# Patient Record
Sex: FEMALE | Race: WHITE | NOT HISPANIC OR LATINO | Employment: FULL TIME | URBAN - METROPOLITAN AREA
[De-identification: names, ages, dates, MRNs, and addresses within clinical notes are randomized per-mention and may not be internally consistent; named-entity substitution may affect disease eponyms.]

---

## 2023-11-06 ENCOUNTER — APPOINTMENT (OUTPATIENT)
Dept: RADIOLOGY | Facility: HOSPITAL | Age: 43
End: 2023-11-06
Payer: COMMERCIAL

## 2023-11-06 ENCOUNTER — HOSPITAL ENCOUNTER (EMERGENCY)
Facility: HOSPITAL | Age: 43
Discharge: HOME | End: 2023-11-06
Attending: EMERGENCY MEDICINE
Payer: COMMERCIAL

## 2023-11-06 VITALS
HEART RATE: 87 BPM | SYSTOLIC BLOOD PRESSURE: 155 MMHG | TEMPERATURE: 98.1 F | OXYGEN SATURATION: 96 % | RESPIRATION RATE: 22 BRPM | DIASTOLIC BLOOD PRESSURE: 102 MMHG | HEIGHT: 64 IN | WEIGHT: 261.02 LBS | BODY MASS INDEX: 44.56 KG/M2

## 2023-11-06 DIAGNOSIS — R00.0 TACHYCARDIA: Primary | ICD-10-CM

## 2023-11-06 DIAGNOSIS — R73.9 HYPERGLYCEMIA: ICD-10-CM

## 2023-11-06 LAB
ALBUMIN SERPL-MCNC: 3.8 G/DL (ref 3.5–5)
ALP BLD-CCNC: 96 U/L (ref 35–125)
ALT SERPL-CCNC: 60 U/L (ref 5–40)
ANION GAP SERPL CALC-SCNC: 15 MMOL/L
AST SERPL-CCNC: 31 U/L (ref 5–40)
BASOPHILS # BLD AUTO: 0.07 X10*3/UL (ref 0–0.1)
BASOPHILS NFR BLD AUTO: 0.5 %
BILIRUB SERPL-MCNC: 0.4 MG/DL (ref 0.1–1.2)
BUN SERPL-MCNC: 8 MG/DL (ref 8–25)
CALCIUM SERPL-MCNC: 8.8 MG/DL (ref 8.5–10.4)
CHLORIDE SERPL-SCNC: 99 MMOL/L (ref 97–107)
CO2 SERPL-SCNC: 21 MMOL/L (ref 24–31)
CREAT SERPL-MCNC: 0.3 MG/DL (ref 0.4–1.6)
D DIMER PPP FEU-MCNC: 0.22 MG/L FEU (ref 0.19–0.5)
EOSINOPHIL # BLD AUTO: 0.13 X10*3/UL (ref 0–0.7)
EOSINOPHIL NFR BLD AUTO: 1 %
ERYTHROCYTE [DISTWIDTH] IN BLOOD BY AUTOMATED COUNT: 13.2 % (ref 11.5–14.5)
GFR SERPL CREATININE-BSD FRML MDRD: >90 ML/MIN/1.73M*2
GLUCOSE SERPL-MCNC: 348 MG/DL (ref 65–99)
HCG SERPL-ACNC: <1 MIU/ML
HCT VFR BLD AUTO: 41.1 % (ref 36–46)
HGB BLD-MCNC: 13.1 G/DL (ref 12–16)
IMM GRANULOCYTES # BLD AUTO: 0.09 X10*3/UL (ref 0–0.7)
IMM GRANULOCYTES NFR BLD AUTO: 0.7 % (ref 0–0.9)
LYMPHOCYTES # BLD AUTO: 4.46 X10*3/UL (ref 1.2–4.8)
LYMPHOCYTES NFR BLD AUTO: 33.3 %
MAGNESIUM SERPL-MCNC: 1.8 MG/DL (ref 1.6–3.1)
MCH RBC QN AUTO: 26.6 PG (ref 26–34)
MCHC RBC AUTO-ENTMCNC: 31.9 G/DL (ref 32–36)
MCV RBC AUTO: 84 FL (ref 80–100)
MONOCYTES # BLD AUTO: 0.79 X10*3/UL (ref 0.1–1)
MONOCYTES NFR BLD AUTO: 5.9 %
NEUTROPHILS # BLD AUTO: 7.87 X10*3/UL (ref 1.2–7.7)
NEUTROPHILS NFR BLD AUTO: 58.6 %
NRBC BLD-RTO: 0 /100 WBCS (ref 0–0)
PLATELET # BLD AUTO: 457 X10*3/UL (ref 150–450)
POTASSIUM SERPL-SCNC: 3.7 MMOL/L (ref 3.4–5.1)
PROT SERPL-MCNC: 7.3 G/DL (ref 5.9–7.9)
RBC # BLD AUTO: 4.92 X10*6/UL (ref 4–5.2)
SODIUM SERPL-SCNC: 135 MMOL/L (ref 133–145)
TROPONIN T SERPL-MCNC: 14 NG/L
TSH SERPL DL<=0.05 MIU/L-ACNC: 1.78 MIU/L (ref 0.27–4.2)
WBC # BLD AUTO: 13.4 X10*3/UL (ref 4.4–11.3)

## 2023-11-06 PROCEDURE — 2500000004 HC RX 250 GENERAL PHARMACY W/ HCPCS (ALT 636 FOR OP/ED): Performed by: PHYSICIAN ASSISTANT

## 2023-11-06 PROCEDURE — 94760 N-INVAS EAR/PLS OXIMETRY 1: CPT

## 2023-11-06 PROCEDURE — 2500000005 HC RX 250 GENERAL PHARMACY W/O HCPCS: Performed by: PHYSICIAN ASSISTANT

## 2023-11-06 PROCEDURE — 85025 COMPLETE CBC W/AUTO DIFF WBC: CPT | Performed by: EMERGENCY MEDICINE

## 2023-11-06 PROCEDURE — 36415 COLL VENOUS BLD VENIPUNCTURE: CPT | Performed by: EMERGENCY MEDICINE

## 2023-11-06 PROCEDURE — 84484 ASSAY OF TROPONIN QUANT: CPT | Performed by: EMERGENCY MEDICINE

## 2023-11-06 PROCEDURE — 71045 X-RAY EXAM CHEST 1 VIEW: CPT | Mod: FY

## 2023-11-06 PROCEDURE — 99284 EMERGENCY DEPT VISIT MOD MDM: CPT | Mod: 25

## 2023-11-06 PROCEDURE — 96375 TX/PRO/DX INJ NEW DRUG ADDON: CPT

## 2023-11-06 PROCEDURE — 80053 COMPREHEN METABOLIC PANEL: CPT | Performed by: EMERGENCY MEDICINE

## 2023-11-06 PROCEDURE — 85300 ANTITHROMBIN III ACTIVITY: CPT | Performed by: EMERGENCY MEDICINE

## 2023-11-06 PROCEDURE — 84702 CHORIONIC GONADOTROPIN TEST: CPT | Performed by: EMERGENCY MEDICINE

## 2023-11-06 PROCEDURE — 83735 ASSAY OF MAGNESIUM: CPT | Performed by: EMERGENCY MEDICINE

## 2023-11-06 PROCEDURE — 2500000001 HC RX 250 WO HCPCS SELF ADMINISTERED DRUGS (ALT 637 FOR MEDICARE OP): Performed by: PHYSICIAN ASSISTANT

## 2023-11-06 PROCEDURE — 2500000004 HC RX 250 GENERAL PHARMACY W/ HCPCS (ALT 636 FOR OP/ED): Performed by: EMERGENCY MEDICINE

## 2023-11-06 PROCEDURE — 2500000005 HC RX 250 GENERAL PHARMACY W/O HCPCS: Performed by: EMERGENCY MEDICINE

## 2023-11-06 PROCEDURE — 96374 THER/PROPH/DIAG INJ IV PUSH: CPT

## 2023-11-06 PROCEDURE — 99285 EMERGENCY DEPT VISIT HI MDM: CPT | Mod: 25 | Performed by: EMERGENCY MEDICINE

## 2023-11-06 PROCEDURE — 84443 ASSAY THYROID STIM HORMONE: CPT | Performed by: PHYSICIAN ASSISTANT

## 2023-11-06 RX ORDER — METOPROLOL TARTRATE 1 MG/ML
5 INJECTION, SOLUTION INTRAVENOUS ONCE
Status: COMPLETED | OUTPATIENT
Start: 2023-11-06 | End: 2023-11-06

## 2023-11-06 RX ORDER — ONDANSETRON HYDROCHLORIDE 2 MG/ML
4 INJECTION, SOLUTION INTRAVENOUS ONCE
Status: COMPLETED | OUTPATIENT
Start: 2023-11-06 | End: 2023-11-06

## 2023-11-06 RX ORDER — METOPROLOL SUCCINATE 50 MG/1
50 TABLET, EXTENDED RELEASE ORAL DAILY
Qty: 30 TABLET | Refills: 0 | Status: SHIPPED | OUTPATIENT
Start: 2023-11-06 | End: 2023-12-01 | Stop reason: SDUPTHER

## 2023-11-06 RX ORDER — ADENOSINE 3 MG/ML
12 INJECTION, SOLUTION INTRAVENOUS ONCE
Status: DISCONTINUED | OUTPATIENT
Start: 2023-11-06 | End: 2023-11-06 | Stop reason: HOSPADM

## 2023-11-06 RX ORDER — ADENOSINE 3 MG/ML
6 INJECTION, SOLUTION INTRAVENOUS ONCE
Status: COMPLETED | OUTPATIENT
Start: 2023-11-06 | End: 2023-11-06

## 2023-11-06 RX ORDER — METOPROLOL TARTRATE 1 MG/ML
5 INJECTION, SOLUTION INTRAVENOUS ONCE
Status: DISCONTINUED | OUTPATIENT
Start: 2023-11-06 | End: 2023-11-06 | Stop reason: HOSPADM

## 2023-11-06 RX ORDER — METFORMIN HYDROCHLORIDE 500 MG/1
500 TABLET ORAL
Qty: 60 TABLET | Refills: 0 | Status: SHIPPED | OUTPATIENT
Start: 2023-11-06 | End: 2023-12-01 | Stop reason: SDUPTHER

## 2023-11-06 RX ORDER — METOPROLOL SUCCINATE 50 MG/1
50 TABLET, EXTENDED RELEASE ORAL DAILY
Status: DISCONTINUED | OUTPATIENT
Start: 2023-11-06 | End: 2023-11-06 | Stop reason: HOSPADM

## 2023-11-06 RX ADMIN — METOPROLOL TARTRATE 5 MG: 5 INJECTION INTRAVENOUS at 19:07

## 2023-11-06 RX ADMIN — ADENOSINE 6 MG: 3 INJECTION INTRAVENOUS at 19:15

## 2023-11-06 RX ADMIN — ONDANSETRON 4 MG: 2 INJECTION INTRAMUSCULAR; INTRAVENOUS at 19:40

## 2023-11-06 RX ADMIN — METOPROLOL SUCCINATE 50 MG: 50 TABLET, EXTENDED RELEASE ORAL at 20:26

## 2023-11-06 RX ADMIN — SODIUM CHLORIDE 1000 ML: 900 INJECTION, SOLUTION INTRAVENOUS at 19:06

## 2023-11-06 RX ADMIN — METOPROLOL TARTRATE 5 MG: 5 INJECTION INTRAVENOUS at 19:30

## 2023-11-06 ASSESSMENT — PAIN SCALES - GENERAL
PAINLEVEL_OUTOF10: 0 - NO PAIN
PAINLEVEL_OUTOF10: 0 - NO PAIN

## 2023-11-06 ASSESSMENT — PAIN - FUNCTIONAL ASSESSMENT: PAIN_FUNCTIONAL_ASSESSMENT: 0-10

## 2023-11-06 ASSESSMENT — COLUMBIA-SUICIDE SEVERITY RATING SCALE - C-SSRS
2. HAVE YOU ACTUALLY HAD ANY THOUGHTS OF KILLING YOURSELF?: NO
1. IN THE PAST MONTH, HAVE YOU WISHED YOU WERE DEAD OR WISHED YOU COULD GO TO SLEEP AND NOT WAKE UP?: NO
6. HAVE YOU EVER DONE ANYTHING, STARTED TO DO ANYTHING, OR PREPARED TO DO ANYTHING TO END YOUR LIFE?: NO

## 2023-11-06 NOTE — ED TRIAGE NOTES
Pt states that she feels that her heart is coming out of her chest. Pt states that she feels like she is having heart palpitations since this morning.

## 2023-11-06 NOTE — ED PROVIDER NOTES
HPI   Chief Complaint   Patient presents with    Palpitations     Pt states that she feels that her heart is coming out of her chest. Pt states that she feels like she is having heart palpitations since this morning.        HPI  42-year-old female here for evaluation of palpitations, no chest pain, no shortness of breath but says she is just not been feeling very well, says that she feels that her heart is beating very fast, they took her blood pressure and vitals on a home blood pressure cuff and her heart rate was in the 140s, family wished her to come to the emergency department                  No data recorded                Patient History   No past medical history on file.  No past surgical history on file.  No family history on file.  Social History     Tobacco Use    Smoking status: Not on file    Smokeless tobacco: Not on file   Substance Use Topics    Alcohol use: Not on file    Drug use: Not on file       Physical Exam   ED Triage Vitals [11/06/23 1812]   Temp Heart Rate Resp BP   36.7 °C (98.1 °F) (!) 145 18 (!) 168/139      SpO2 Temp Source Heart Rate Source Patient Position   98 % Temporal Monitor --      BP Location FiO2 (%)     -- --       Physical Exam  PHYSICAL EXAMINATION    GENERAL APPEARANCE: Awake and alert.     VITAL SIGNS: As per the nurses' triage record.     HEENT: Normocephalic, atraumatic. Extraocular muscles are intact. Pupils equal round and reactive to light. Conjunctiva are pink. Negative scleral icterus. Mucous membranes are moist. Tongue in the midline. Pharynx was without erythema or exudates, uvula midline    NECK: Soft Nontender and supple, full gross ROM, no meningeal signs.    CHEST: Nontender to palpation. Clear to auscultation bilaterally. No rales, rhonchi, or wheezing.     HEART: S1, S2.  Tachycardic    ABDOMEN: Soft, obese, nontender, nondistended, positive bowel sounds, no palpable masses.    MUSCULOSKELETAL: The calves are nontender to palpation. Full gross active range  of motion. Ambulating on own with no acute difficulties     NEUROLOGICAL: Awake, alert and oriented x 3. Power intact in the upper and lower extremities. Sensation is intact to light touch in the upper and lower extremities.     IMMUNOLOGICAL: No lymphatic streaking noted     DERM: No petechiae, rashes, or ecchymoses.  ED Course & MDM   ED Course as of 11/06/23 2021   Mon Nov 06, 202306, 2023 1910 Initially order was placed for adenosine however due to a continuous heart rate at 140 an order for metoprolol was placed, cardiology came down and evaluated patient in real-time and request adenosine be given, this was ordered once again at cardiology request [AP]   1920 Adenosine was given, cardiac rhythm evaluated on continuous EKG monitoring and reviewed by cardiologist [AP]   1939 For second dose of metoprolol heart rate in the 80s, another EKG has been ordered [AP]      ED Course User Index  [AP] Josh Osborn PA-C         Diagnoses as of 11/06/23 2021   Tachycardia   Hyperglycemia       Medical Decision Making  Patient has been seen in conjunction with attending physician Dr. Vaca    Parts of this chart have been completed using voice recognition software. Please excuse any errors of transcription.  My thought process and reason for plan has been formulated from the time that I saw the patient until the time of disposition and is not specific to one specific moment during their visit and furthermore my MDM encompasses this entire chart and not only this text box.      HPI: Detailed above.    Exam: A medically appropriate exam performed, outlined above, given the known history and presentation.    History obtained from: The patient    EKG: Obtained read by attending physician and reviewed by myself    Social Determinants of Health considered during this visit: lives at home    Medications given during visit:  Medications   adenosine (Adenocard) injection 12 mg (12 mg intravenous Not Given 11/6/23 1910)    metoprolol tartrate (Lopressor) injection 5 mg (5 mg intravenous Not Given 11/6/23 1930)   metoprolol succinate XL (Toprol-XL) 24 hr tablet 50 mg (has no administration in time range)   adenosine (Adenocard) injection 6 mg (6 mg intravenous Given 11/6/23 1915)   metoprolol tartrate (Lopressor) injection 5 mg (5 mg intravenous Given 11/6/23 1907)   sodium chloride 0.9 % bolus 1,000 mL (0 mL intravenous Stopped 11/6/23 1936)   metoprolol tartrate (Lopressor) injection 5 mg (5 mg intravenous Given 11/6/23 1930)   ondansetron (Zofran) injection 4 mg (4 mg intravenous Given 11/6/23 1940)        Diagnostic/tests  Labs Reviewed   CBC WITH AUTO DIFFERENTIAL - Abnormal       Result Value    WBC 13.4 (*)     nRBC 0.0      RBC 4.92      Hemoglobin 13.1      Hematocrit 41.1      MCV 84      MCH 26.6      MCHC 31.9 (*)     RDW 13.2      Platelets 457 (*)     Neutrophils % 58.6      Immature Granulocytes %, Automated 0.7      Lymphocytes % 33.3      Monocytes % 5.9      Eosinophils % 1.0      Basophils % 0.5      Neutrophils Absolute 7.87 (*)     Immature Granulocytes Absolute, Automated 0.09      Lymphocytes Absolute 4.46      Monocytes Absolute 0.79      Eosinophils Absolute 0.13      Basophils Absolute 0.07     COMPREHENSIVE METABOLIC PANEL - Abnormal    Glucose 348 (*)     Sodium 135      Potassium 3.7      Chloride 99      Bicarbonate 21 (*)     Urea Nitrogen 8      Creatinine 0.30 (*)     eGFR >90      Calcium 8.8      Albumin 3.8      Alkaline Phosphatase 96      Total Protein 7.3      AST 31      Bilirubin, Total 0.4      ALT 60 (*)     Anion Gap 15     MAGNESIUM - Normal    Magnesium 1.80     TSH WITH REFLEX TO FREE T4 IF ABNORMAL - Normal    Thyroid Stimulating Hormone 1.78      Narrative:     TSH testing is performed using different testing methodology at [facility] than at other [facility] hospitals. Direct result comparisons should only be made within the same method.     D-DIMER, NON VTE - Normal     D-Dimer Non VTE, Quant (mg/L FEU) 0.22      Narrative:     THROMBOEMBOLIC EVENTS CANNOT BE EXCLUDED SOLELY ON THE BASIS OF THE D-DIMER LEVEL BEING WITHIN THE NORMAL REFERENCE RANGE. D-DIMER LEVELS LESS THAN 0.5 MG/L FEU IN CONJUNCTION WITH A LOW CLINICAL PROBABILITY HAVE AN EXCELLENT NEGATIVE PREDICTIVE VALUE IN EXCLUDING A DIAGNOSIS OF PULMONARY EMBOLUS (PE) OR DEEP VEIN THROMBOSIS (DVT). ELEVATED D-DIMER LEVELS ARE NOT SPECIFIC TO PE OR DVT, AND MAY BE SEEN IN PATIENTS WITH DIC, ADVANCED AGE, PREGNANCY, MALIGNANCY, LIVER DISEASE, INFECTION, AND INFLAMMATORY CONDITIONS AMONG OTHERS. D-DIMER LEVELS MAY BE DECREASED IN PATIENTS RECEIVING ANTI-COAGULATION THERAPY.   SERIAL TROPONIN, INITIAL ([facility]) - Normal    Troponin T, High Sensitivity 14     HUMAN CHORIONIC GONADOTROPIN, SERUM QUANTITATIVE    HCG, Beta-Quantitative <1     TROPONIN T SERIES, HIGH SENSITIVITY (0, 2 HR, 6 HR)    Narrative:     The following orders were created for panel order Troponin T Series, High Sensitivity (0, 2HR, 6HR).  Procedure                               Abnormality         Status                     ---------                               -----------         ------                     Serial Troponin, Initial...[[id number]]  Normal              Final result               Serial Troponin, 2 Hour ...[[id number]]                                                   Please view results for these tests on the individual orders.   SERIAL TROPONIN,  2 HOUR ([facility])      XR chest 1 view   Final Result   No acute cardiopulmonary process.                  Signed by: Sheila Crespo 11/6/2023 7:10 PM   Dictation workstation:   [id number]           Considerations/further MDM:  I estimate there is low risk for acute coronary syndrome including MI, intracranial hemorrhage, cardiac dysrhythmia, hypertension, tamponade, pneumothorax, hemothorax, pulmonary embolism, sepsis, intracranial hemorrhage, dissection, pneumonia, respiratory distress or compromise,  pericardial effusion,  thus I consider the discharge disposition reasonable. We have discussed the diagnosis and risks, and we agree with discharging home to follow-up with their primary doctor or specialist as discussed and as provided.  We also discussed returning to the Emergency Department immediately if new or worsening symptoms occur. We have discussed the symptoms which are most concerning (e.g., bloody sputum, fever, worsening pain or shortness of breath, vomiting, weakness) that necessitate immediate return.    The patient has been seen and evaluated in the emergency department by cardiology, he has requested discharge, he requested Toprol XL 50 to be started.  He will see the patient in outpatient basis.  Additionally patient will be referred to a new family doctor as I have concern for diabetes with elevated blood sugar levels.  Educated them on the importance of monitoring their diet, placed order for diabetic consultation, and recommend very close follow-up to family doctor for further management    Home medications: Metformin, Toprol-XL      Procedure  Procedures     Josh Osborn PA-C  11/06/23 2022

## 2023-11-07 NOTE — DISCHARGE INSTRUCTIONS
Your blood sugar was elevated, I have concern that you may have diabetes and you will need additional testing through family doctor.  Additionally you will need to follow-up with cardiology, they saw you in the emergency department and we are initiating new medications to manage your symptoms, if you develop new different or worsening pains or symptoms please return immediately to the emergency department.

## 2023-11-07 NOTE — CONSULTS
"Consults  History Of Present Illness:    Hayley Marquez is a 42 y.o. female presenting with no significant past medical history recently moved from [place] to stay close to her father and mother-in-law and currently gainfully employed at [employer] started having symptoms of palpitations soon after completing a break this afternoon.  She informs me she did had an episode of palpitations when she was pregnant with her second daughter 13 years ago.  She denies having work-up for SVT in the past.  Has not had any palpitations in the last 13 years.  Denies any symptoms of chest pain.  Denies any presyncope or syncopal episodes.  She admits smoking 1 pack a week.  She used to smoke more but she is cutting back on smoking.  Denies any use of alcohol.  Last Recorded Vitals:  Vitals:    11/06/23 1845 11/06/23 1900 11/06/23 1915 11/06/23 1930   BP:   (!) 172/132 (!) 161/120   Pulse: (!) 139 (!) 138 (!) 124 (!) 124   Resp: 20 17 19 25   Temp:       TempSrc:       SpO2: 97% 98% 97% 97%   Weight:       Height:           Last Labs:  CBC - 11/6/2023:  6:54 PM  13.4 13.1 457    41.1      CMP - No results in last year.  _ _ _ --- _   _ _ _ _      PTT - No results in last year.  _   _ _     No results found for: \"TROPHS\", \"BNP\", \"HGBA1C\", \"LDLCALC\", \"VLDL\"   Last I/O:  No intake/output data recorded.    Past Cardiology Tests (Last 3 Years):  EKG:  No results found for this or any previous visit from the past 1095 days.    Echo:  No results found for this or any previous visit from the past 1095 days.    Ejection Fractions:  No results found for: \"EF\"  Cath:  No results found for this or any previous visit from the past 1095 days.    Stress Test:  No results found for this or any previous visit from the past 1095 days.    Cardiac Imaging:  No results found for this or any previous visit from the past 1095 days.      Past Medical History:  She has no past medical history on file.    Past Surgical History:  She has no past surgical " history on file.      Social History:  Patient is not  she has 2 daughters 23 and 13 years of age.  Admits smoking 1 pack load last for week.  Denies any use of alcohol.  She recently moved from [place] and currently staying with her ex mother-in-law and father-in-law  Family History:  Father passed away in his late 70s in a motor vehicle accident after he walked in front of a car while being drunk.  Mother passed away at 40 years of age from complications of AIDS.     Allergies:  Percocet [oxycodone-acetaminophen] and Penicillins    Inpatient Medications:  Scheduled medications   Medication Dose Route Frequency    adenosine  12 mg intravenous Once    metoprolol  5 mg intravenous Once    ondansetron  4 mg intravenous Once     PRN medications   Medication     Continuous Medications   Medication Dose Last Rate     Outpatient Medications:  No current outpatient medications    Physical Exam:  Physical Exam  Constitutional:       Appearance: Normal appearance.   HENT:      Head: Normocephalic and atraumatic.      Nose: Nose normal.      Mouth/Throat:      Mouth: Mucous membranes are moist.      Pharynx: Oropharynx is clear.   Eyes:      Pupils: Pupils are equal, round, and reactive to light.   Cardiovascular:      Rate and Rhythm: Normal rate and regular rhythm.      Pulses: Normal pulses.   Pulmonary:      Effort: Pulmonary effort is normal.      Breath sounds: Normal breath sounds.   Abdominal:      General: Abdomen is flat. Bowel sounds are normal.      Palpations: Abdomen is soft.   Musculoskeletal:         General: Normal range of motion.   Skin:     General: Skin is warm and dry.      Capillary Refill: Capillary refill takes more than 3 seconds.   Neurological:      General: No focal deficit present.      Mental Status: He is alert and oriented to person, place, and time.   Psychiatric:         Mood and Affect: Mood normal.         Behavior: Behavior normal.         Thought Content: Thought content  normal.         Judgment: Judgment normal.       Assessment/Plan   1.  Supraventricular tachycardia-patient probably has 2-1 atrial tachycardia.  Patient did receive adenosine 6 mg and though she slowed down briefly and sleeves to back into SVT.  Patient did received IV Lopressor 5 mg we will give additional dose of Lopressor IV now and see if she will convert back to sinus rhythm.  She converted back to sinus rhythm patient discharged home on Toprol-XL 50 mg daily and will arrange for outpatient work-up with Electro-physiology in consideration for radiofrequency cath ablation.  I have discussed the plan of care with the patient at the bedside in the emergency room and will await the outcome of IV Lopressor and see how she tolerates it       Code Status:  No Order    I spent 35 minutes in the professional and overall care of this patient.        Brad Ervin MD

## 2023-11-08 ENCOUNTER — OFFICE VISIT (OUTPATIENT)
Dept: PRIMARY CARE | Facility: CLINIC | Age: 43
End: 2023-11-08
Payer: COMMERCIAL

## 2023-11-08 VITALS
BODY MASS INDEX: 44.56 KG/M2 | HEIGHT: 64 IN | WEIGHT: 261 LBS | DIASTOLIC BLOOD PRESSURE: 110 MMHG | SYSTOLIC BLOOD PRESSURE: 168 MMHG

## 2023-11-08 DIAGNOSIS — I47.10 SVT (SUPRAVENTRICULAR TACHYCARDIA) (CMS-HCC): Primary | ICD-10-CM

## 2023-11-08 DIAGNOSIS — E11.9 TYPE 2 DIABETES MELLITUS WITHOUT COMPLICATION, WITHOUT LONG-TERM CURRENT USE OF INSULIN (MULTI): ICD-10-CM

## 2023-11-08 DIAGNOSIS — I10 PRIMARY HYPERTENSION: ICD-10-CM

## 2023-11-08 DIAGNOSIS — E66.01 CLASS 3 SEVERE OBESITY WITH SERIOUS COMORBIDITY AND BODY MASS INDEX (BMI) OF 40.0 TO 44.9 IN ADULT, UNSPECIFIED OBESITY TYPE (MULTI): ICD-10-CM

## 2023-11-08 PROCEDURE — 3080F DIAST BP >= 90 MM HG: CPT | Performed by: INTERNAL MEDICINE

## 2023-11-08 PROCEDURE — 99205 OFFICE O/P NEW HI 60 MIN: CPT | Performed by: INTERNAL MEDICINE

## 2023-11-08 PROCEDURE — 3008F BODY MASS INDEX DOCD: CPT | Performed by: INTERNAL MEDICINE

## 2023-11-08 PROCEDURE — 3077F SYST BP >= 140 MM HG: CPT | Performed by: INTERNAL MEDICINE

## 2023-11-08 PROCEDURE — 4010F ACE/ARB THERAPY RXD/TAKEN: CPT | Performed by: INTERNAL MEDICINE

## 2023-11-08 RX ORDER — TIRZEPATIDE 2.5 MG/.5ML
2.5 INJECTION, SOLUTION SUBCUTANEOUS
Qty: 3 ML | Refills: 0 | Status: SHIPPED | OUTPATIENT
Start: 2023-11-08 | End: 2023-12-01 | Stop reason: DRUGHIGH

## 2023-11-08 RX ORDER — LOSARTAN POTASSIUM 100 MG/1
100 TABLET ORAL DAILY
Qty: 30 TABLET | Refills: 0 | Status: SHIPPED | OUTPATIENT
Start: 2023-11-08 | End: 2023-12-01 | Stop reason: SDUPTHER

## 2023-11-08 RX ORDER — INSULIN PUMP SYRINGE, 3 ML
EACH MISCELLANEOUS
Qty: 1 EACH | Refills: 0 | Status: SHIPPED | OUTPATIENT
Start: 2023-11-08 | End: 2024-11-07

## 2023-11-08 NOTE — LETTER
November 8, 2023     Patient: Hayley Marquez   YOB: 1980   Date of Visit: 11/8/2023       To Whom It May Concern:    Hayley Marquez was seen in my clinic on 11/8/2023 at 10:45 am. Please excuse Hayley for her absence from work on 11/7/23, 11/8/23, and 11/9/23. Hayley can return to work on 11/10/23.    If you have any questions or concerns, please don't hesitate to call.         Sincerely,         Richa Perez MD

## 2023-11-15 NOTE — PROGRESS NOTES
"Subjective   Patient ID: Hayley Marquez is a 42 y.o. female who presents for New Patient Visit (Er follow up).    HPI   Patient is here to establish as new PCP  She is also here for ER follow-up  Patient recently moved from [place] to stay closer to her father and mother-in-law.  She started her job at [employer].  She started having symptoms of palpitation soon after completing a break during the birth.  She went to the emergency room and found to have SVT.  Given adenosine.  Discharged on Toprol-XL 50 mg  She is here for follow-up on blood pressures,    Past medical history: Degenerative disc disease of neck s/p 2 surgeries, rheumatoid arthritis, elevated blood pressure on occasions, gestational diabetes  Occupation: Retail/  Social history: Non-smoker, social alcohol use, history of drug use meth ,pot ,shrooms  Family history: Maternal grandmother diabetes, mother  young of AIDS, father  of alcohol had accidental death  Review of Systems    Objective   BP (!) 168/110   Ht 1.626 m (5' 4\")   Wt 118 kg (261 lb)   BMI 44.80 kg/m²     Physical Exam  Vitals reviewed.   Constitutional:       Appearance: Normal appearance.   HENT:      Head: Normocephalic and atraumatic.      Right Ear: Tympanic membrane, ear canal and external ear normal.      Left Ear: Tympanic membrane, ear canal and external ear normal.      Nose: Nose normal.      Mouth/Throat:      Pharynx: Oropharynx is clear.   Eyes:      Extraocular Movements: Extraocular movements intact.      Conjunctiva/sclera: Conjunctivae normal.      Pupils: Pupils are equal, round, and reactive to light.   Cardiovascular:      Rate and Rhythm: Normal rate and regular rhythm.      Pulses: Normal pulses.      Heart sounds: Normal heart sounds.   Pulmonary:      Effort: Pulmonary effort is normal.      Breath sounds: Normal breath sounds.   Abdominal:      General: Abdomen is flat. Bowel sounds are normal.      Palpations: Abdomen is soft. "   Musculoskeletal:      Cervical back: Normal range of motion and neck supple.   Skin:     General: Skin is warm and dry.   Neurological:      General: No focal deficit present.      Mental Status: She is alert and oriented to person, place, and time.   Psychiatric:         Mood and Affect: Mood normal.         Assessment/Plan   Problem List Items Addressed This Visit    None  Visit Diagnoses         Codes    SVT (supraventricular tachycardia)    -  Primary I47.10    Type 2 diabetes mellitus without complication, without long-term current use of insulin (CMS/[facility])     E11.9    Relevant Medications    tirzepatide (Mounjaro) 2.5 mg/0.5 mL pen injector    losartan (Cozaar) 100 mg tablet    FreeStyle glucose monitoring kit    Other Relevant Orders    Comprehensive Metabolic Panel    Lipid Panel    Hemoglobin A1C    Primary hypertension     I10    Relevant Medications    tirzepatide (Mounjaro) 2.5 mg/0.5 mL pen injector    losartan (Cozaar) 100 mg tablet    FreeStyle glucose monitoring kit    Other Relevant Orders    Comprehensive Metabolic Panel    Lipid Panel    Hemoglobin A1C    Class 3 severe obesity with serious comorbidity and body mass index (BMI) of 40.0 to 44.9 in adult, unspecified obesity type (CMS/[facility])     E66.01, Z68.41          ER records reviewed  Blood sugars very high 348  Recheck CMP fasting with A1c  Glucometer ordered test strips ordered  Losartan added follow-up uncontrolled blood pressure  Off work note given  Also discussed Mounjaro to help lose weight and control diabetes  Follow-up in a week after the blood work for blood pressure check and sugar check

## 2023-12-01 ENCOUNTER — OFFICE VISIT (OUTPATIENT)
Dept: PRIMARY CARE | Facility: CLINIC | Age: 43
End: 2023-12-01
Payer: COMMERCIAL

## 2023-12-01 VITALS
HEIGHT: 64 IN | WEIGHT: 266.6 LBS | SYSTOLIC BLOOD PRESSURE: 142 MMHG | BODY MASS INDEX: 45.52 KG/M2 | DIASTOLIC BLOOD PRESSURE: 82 MMHG

## 2023-12-01 DIAGNOSIS — E66.01 MORBID OBESITY WITH BODY MASS INDEX (BMI) OF 40.0 OR HIGHER (MULTI): Primary | ICD-10-CM

## 2023-12-01 DIAGNOSIS — R00.0 TACHYCARDIA: ICD-10-CM

## 2023-12-01 DIAGNOSIS — I10 PRIMARY HYPERTENSION: ICD-10-CM

## 2023-12-01 DIAGNOSIS — E11.9 TYPE 2 DIABETES MELLITUS WITHOUT COMPLICATION, WITHOUT LONG-TERM CURRENT USE OF INSULIN (MULTI): ICD-10-CM

## 2023-12-01 PROCEDURE — 3077F SYST BP >= 140 MM HG: CPT | Performed by: INTERNAL MEDICINE

## 2023-12-01 PROCEDURE — 3008F BODY MASS INDEX DOCD: CPT | Performed by: INTERNAL MEDICINE

## 2023-12-01 PROCEDURE — 99214 OFFICE O/P EST MOD 30 MIN: CPT | Performed by: INTERNAL MEDICINE

## 2023-12-01 PROCEDURE — 3079F DIAST BP 80-89 MM HG: CPT | Performed by: INTERNAL MEDICINE

## 2023-12-01 PROCEDURE — 4010F ACE/ARB THERAPY RXD/TAKEN: CPT | Performed by: INTERNAL MEDICINE

## 2023-12-01 RX ORDER — TRIAMTERENE AND HYDROCHLOROTHIAZIDE 37.5; 25 MG/1; MG/1
1 CAPSULE ORAL EVERY MORNING
Qty: 90 CAPSULE | Refills: 0 | Status: SHIPPED | OUTPATIENT
Start: 2023-12-01 | End: 2024-11-30

## 2023-12-01 RX ORDER — LOSARTAN POTASSIUM 100 MG/1
100 TABLET ORAL DAILY
Qty: 90 TABLET | Refills: 0 | Status: SHIPPED | OUTPATIENT
Start: 2023-12-01 | End: 2024-05-29

## 2023-12-01 RX ORDER — TIRZEPATIDE 5 MG/.5ML
5 INJECTION, SOLUTION SUBCUTANEOUS
Qty: 3 ML | Refills: 0 | Status: SHIPPED | OUTPATIENT
Start: 2023-12-01

## 2023-12-01 RX ORDER — METOPROLOL SUCCINATE 50 MG/1
50 TABLET, EXTENDED RELEASE ORAL DAILY
Qty: 90 TABLET | Refills: 0 | Status: SHIPPED | OUTPATIENT
Start: 2023-12-01 | End: 2024-02-29

## 2023-12-01 RX ORDER — NYSTATIN 100000 U/G
CREAM TOPICAL 2 TIMES DAILY
Qty: 30 G | Refills: 0 | Status: SHIPPED | OUTPATIENT
Start: 2023-12-01 | End: 2023-12-08

## 2023-12-01 RX ORDER — METFORMIN HYDROCHLORIDE 500 MG/1
500 TABLET ORAL
Qty: 180 TABLET | Refills: 0 | Status: SHIPPED | OUTPATIENT
Start: 2023-12-01 | End: 2024-05-29

## 2023-12-01 ASSESSMENT — ENCOUNTER SYMPTOMS
OCCASIONAL FEELINGS OF UNSTEADINESS: 0
DEPRESSION: 0
LOSS OF SENSATION IN FEET: 0

## 2023-12-01 NOTE — PROGRESS NOTES
"Subjective   Patient ID: Hayley Marquez is a 42 y.o. female who presents for Med Refill and Follow-up.    HPI   Patient is here for follow-up  Needs medication refill  Blood sugars are running between 200-212  Tanisha is helping with the blood sugar but did not lose weight would like to increase the dose     patient is here to establish as new PCP  She is also here for ER follow-up  Patient recently moved from [place] to stay closer to her father and mother-in-law.  She started her job at [employer].  She started having symptoms of palpitation soon after completing a break during the birth.  She went to the emergency room and found to have SVT.  Given adenosine.  Discharged on Toprol-XL 50 mg  She is here for follow-up on blood pressures,    Past medical history: Degenerative disc disease of neck s/p 2 surgeries, rheumatoid arthritis, elevated blood pressure on occasions, gestational diabetes  Occupation: Retail/  Social history: Non-smoker, social alcohol use, history of drug use meth ,pot ,shrooms  Family history: Maternal grandmother diabetes, mother  young of AIDS, father  of alcohol had accidental death  Review of Systems    Objective   /82   Ht 1.626 m (5' 4\")   Wt 121 kg (266 lb 9.6 oz)   BMI 45.76 kg/m²     Physical Exam  Vitals reviewed.   Constitutional:       Appearance: Normal appearance.   HENT:      Head: Normocephalic and atraumatic.      Right Ear: Tympanic membrane, ear canal and external ear normal.      Left Ear: Tympanic membrane, ear canal and external ear normal.      Nose: Nose normal.      Mouth/Throat:      Pharynx: Oropharynx is clear.   Eyes:      Extraocular Movements: Extraocular movements intact.      Conjunctiva/sclera: Conjunctivae normal.      Pupils: Pupils are equal, round, and reactive to light.   Cardiovascular:      Rate and Rhythm: Normal rate and regular rhythm.      Pulses: Normal pulses.      Heart sounds: Normal heart sounds.   Pulmonary:      " Effort: Pulmonary effort is normal.      Breath sounds: Normal breath sounds.   Abdominal:      General: Abdomen is flat. Bowel sounds are normal.      Palpations: Abdomen is soft.   Musculoskeletal:      Cervical back: Normal range of motion and neck supple.   Skin:     General: Skin is warm and dry.      Findings: Rash present.      Comments: Skinfold candidiasis   Neurological:      General: No focal deficit present.      Mental Status: She is alert and oriented to person, place, and time.   Psychiatric:         Mood and Affect: Mood normal.         Assessment/Plan   Problem List Items Addressed This Visit             ICD-10-CM       Cardiac and Vasculature    Primary hypertension I10    Relevant Medications    losartan (Cozaar) 100 mg tablet    triamterene-hydrochlorothiazid (Dyazide) 37.5-25 mg capsule    Tachycardia R00.0    Relevant Medications    metFORMIN (Glucophage) 500 mg tablet    metoprolol succinate XL (Toprol-XL) 50 mg 24 hr tablet    triamterene-hydrochlorothiazid (Dyazide) 37.5-25 mg capsule       Endocrine/Metabolic    Type 2 diabetes mellitus without complication, without long-term current use of insulin (CMS/[facility]) E11.9    Relevant Medications    losartan (Cozaar) 100 mg tablet    tirzepatide (Mounjaro) 5 mg/0.5 mL pen injector    triamterene-hydrochlorothiazid (Dyazide) 37.5-25 mg capsule     Other Visit Diagnoses         Codes    Morbid obesity with body mass index (BMI) of 40.0 or higher (CMS/[facility])    -  Primary E66.01        Past recap  ER records reviewed  Blood sugars very high 348  Recheck CMP fasting with A1c  Glucometer ordered test strips ordered  Losartan added follow-up uncontrolled blood pressure  Off work note given  Also discussed Mounjaro to help lose weight and control diabetes  Follow-up in a week after the blood work for blood pressure check and sugar check     12/1/2023  Will increase dose of Mounjaro  Discussed side effects of Mounjaro  Patient understands  She has to combine  with diet and exercise  Last blood work reviewed  Blood pressure still high will add Dyazide  Nystatin cream for the skin fold candidiasis  Follow-up in a month  Discussed diet and exercise

## 2023-12-05 ENCOUNTER — APPOINTMENT (OUTPATIENT)
Dept: CARDIOLOGY | Facility: CLINIC | Age: 43
End: 2023-12-05
Payer: COMMERCIAL

## 2023-12-09 PROBLEM — R00.0 TACHYCARDIA: Status: ACTIVE | Noted: 2023-12-09

## 2023-12-09 PROBLEM — E11.9 TYPE 2 DIABETES MELLITUS WITHOUT COMPLICATION, WITHOUT LONG-TERM CURRENT USE OF INSULIN (MULTI): Status: ACTIVE | Noted: 2023-12-09

## 2023-12-09 PROBLEM — I10 PRIMARY HYPERTENSION: Status: ACTIVE | Noted: 2023-12-09

## 2023-12-11 ENCOUNTER — LAB (OUTPATIENT)
Dept: LAB | Facility: LAB | Age: 43
End: 2023-12-11
Payer: COMMERCIAL

## 2023-12-11 DIAGNOSIS — I10 PRIMARY HYPERTENSION: ICD-10-CM

## 2023-12-11 DIAGNOSIS — E11.9 TYPE 2 DIABETES MELLITUS WITHOUT COMPLICATION, WITHOUT LONG-TERM CURRENT USE OF INSULIN (MULTI): ICD-10-CM

## 2023-12-11 LAB
ALBUMIN SERPL BCP-MCNC: 4.5 G/DL (ref 3.4–5)
ALP SERPL-CCNC: 78 U/L (ref 33–110)
ALT SERPL W P-5'-P-CCNC: 53 U/L (ref 7–45)
ANION GAP SERPL CALC-SCNC: 15 MMOL/L (ref 10–20)
AST SERPL W P-5'-P-CCNC: 29 U/L (ref 9–39)
BILIRUB SERPL-MCNC: 0.6 MG/DL (ref 0–1.2)
BUN SERPL-MCNC: 29 MG/DL (ref 6–23)
CALCIUM SERPL-MCNC: 10 MG/DL (ref 8.6–10.6)
CHLORIDE SERPL-SCNC: 102 MMOL/L (ref 98–107)
CHOLEST SERPL-MCNC: 131 MG/DL (ref 0–199)
CHOLESTEROL/HDL RATIO: 5.1
CO2 SERPL-SCNC: 26 MMOL/L (ref 21–32)
CREAT SERPL-MCNC: 0.92 MG/DL (ref 0.5–1.05)
EST. AVERAGE GLUCOSE BLD GHB EST-MCNC: 237 MG/DL
GFR SERPL CREATININE-BSD FRML MDRD: 79 ML/MIN/1.73M*2
GLUCOSE SERPL-MCNC: 155 MG/DL (ref 74–99)
HBA1C MFR BLD: 9.9 %
HDLC SERPL-MCNC: 25.5 MG/DL
LDLC SERPL CALC-MCNC: 40 MG/DL
NON HDL CHOLESTEROL: 106 MG/DL (ref 0–149)
POTASSIUM SERPL-SCNC: 4.3 MMOL/L (ref 3.5–5.3)
PROT SERPL-MCNC: 7.7 G/DL (ref 6.4–8.2)
SODIUM SERPL-SCNC: 139 MMOL/L (ref 136–145)
TRIGL SERPL-MCNC: 329 MG/DL (ref 0–149)
VLDL: 66 MG/DL (ref 0–40)

## 2023-12-11 PROCEDURE — 80061 LIPID PANEL: CPT

## 2023-12-11 PROCEDURE — 36415 COLL VENOUS BLD VENIPUNCTURE: CPT

## 2023-12-11 PROCEDURE — 83036 HEMOGLOBIN GLYCOSYLATED A1C: CPT

## 2023-12-11 PROCEDURE — 80053 COMPREHEN METABOLIC PANEL: CPT

## 2023-12-21 ENCOUNTER — APPOINTMENT (OUTPATIENT)
Dept: PRIMARY CARE | Facility: CLINIC | Age: 43
End: 2023-12-21
Payer: COMMERCIAL

## 2024-01-05 ENCOUNTER — APPOINTMENT (OUTPATIENT)
Dept: PRIMARY CARE | Facility: CLINIC | Age: 44
End: 2024-01-05
Payer: COMMERCIAL